# Patient Record
Sex: FEMALE | Race: WHITE | NOT HISPANIC OR LATINO
[De-identification: names, ages, dates, MRNs, and addresses within clinical notes are randomized per-mention and may not be internally consistent; named-entity substitution may affect disease eponyms.]

---

## 2017-03-25 ENCOUNTER — LAB SERVICES (OUTPATIENT)
Dept: OTHER | Age: 37
End: 2017-03-25

## 2017-03-25 ENCOUNTER — CHARTING TRANS (OUTPATIENT)
Dept: OTHER | Age: 37
End: 2017-03-25

## 2017-03-25 LAB — RAPID STREP GROUP A: NORMAL

## 2018-03-02 ENCOUNTER — HOSPITAL ENCOUNTER (OUTPATIENT)
Age: 38
Discharge: HOME | End: 2018-03-02
Attending: FAMILY MEDICINE
Payer: COMMERCIAL

## 2018-03-02 VITALS
SYSTOLIC BLOOD PRESSURE: 105 MMHG | HEART RATE: 90 BPM | TEMPERATURE: 98 F | DIASTOLIC BLOOD PRESSURE: 69 MMHG | OXYGEN SATURATION: 100 % | RESPIRATION RATE: 18 BRPM

## 2018-03-02 DIAGNOSIS — J04.0 ACUTE LARYNGITIS: Primary | ICD-10-CM

## 2018-03-02 DIAGNOSIS — J06.9 VIRAL UPPER RESPIRATORY TRACT INFECTION: ICD-10-CM

## 2018-03-02 LAB
S PYO AG THROAT QL: NEGATIVE
S PYO AG THROAT QL: NEGATIVE

## 2018-03-02 PROCEDURE — 87430 STREP A AG IA: CPT

## 2018-03-02 PROCEDURE — 99203 OFFICE O/P NEW LOW 30 MIN: CPT

## 2018-03-02 PROCEDURE — 99202 OFFICE O/P NEW SF 15 MIN: CPT

## 2018-03-02 RX ORDER — INSULIN LISPRO 100 [IU]/ML
INJECTION, SOLUTION INTRAVENOUS; SUBCUTANEOUS
COMMUNITY

## 2018-03-02 RX ORDER — LEVOTHYROXINE SODIUM 0.05 MG/1
TABLET ORAL
Refills: 5 | COMMUNITY
Start: 2018-02-01

## 2018-03-03 NOTE — ED INITIAL ASSESSMENT (HPI)
Pt here with complaint of a sore throat and left ear that began 4 days ago, pt states she has been coughing a lot as well, pt denies any fever

## 2018-03-03 NOTE — ED PROVIDER NOTES
Patient Seen in: [address]    History   CC:  Patient presents with:  Sore Throat  Ear Problem Pain (neurosensory)    Stated Complaint: ear ache; sore throat    ------------------------------  Per Rn:     here with compl adenopathy;     thyroid:  no enlargement/tenderness/nodules; no carotid    bruit or JVD   Heart   S1 S2 w/RRR   Lungs:     Clear to auscultation bilaterally, respirations unlabored   Chest Wall:    No tenderness or deformity   Abd:   Soft, Nt, No OSM

## 2018-11-05 VITALS
SYSTOLIC BLOOD PRESSURE: 94 MMHG | DIASTOLIC BLOOD PRESSURE: 66 MMHG | RESPIRATION RATE: 18 BRPM | HEART RATE: 84 BPM | TEMPERATURE: 98.3 F

## 2019-02-26 ENCOUNTER — HOSPITAL ENCOUNTER (OUTPATIENT)
Age: 39
Discharge: HOME | End: 2019-02-26
Attending: EMERGENCY MEDICINE
Payer: COMMERCIAL

## 2019-02-26 ENCOUNTER — APPOINTMENT (OUTPATIENT)
Dept: GENERAL RADIOLOGY | Age: 39
End: 2019-02-26
Attending: EMERGENCY MEDICINE
Payer: COMMERCIAL

## 2019-02-26 VITALS
WEIGHT: 129 LBS | HEIGHT: 63 IN | BODY MASS INDEX: 22.86 KG/M2 | DIASTOLIC BLOOD PRESSURE: 73 MMHG | SYSTOLIC BLOOD PRESSURE: 109 MMHG | TEMPERATURE: 98 F | OXYGEN SATURATION: 100 % | RESPIRATION RATE: 20 BRPM | HEART RATE: 72 BPM

## 2019-02-26 DIAGNOSIS — S30.0XXA CONTUSION OF COCCYX, INITIAL ENCOUNTER: Primary | ICD-10-CM

## 2019-02-26 DIAGNOSIS — S30.0XXA CONTUSION OF SACRUM, INITIAL ENCOUNTER: ICD-10-CM

## 2019-02-26 LAB — B-HCG UR QL: NEGATIVE

## 2019-02-26 PROCEDURE — 72220 X-RAY EXAM SACRUM TAILBONE: CPT | Performed by: EMERGENCY MEDICINE

## 2019-02-26 PROCEDURE — 81025 URINE PREGNANCY TEST: CPT

## 2019-02-26 PROCEDURE — 99213 OFFICE O/P EST LOW 20 MIN: CPT

## 2019-02-26 NOTE — ED PROVIDER NOTES
Patient Seen in: [address]    History   Patient presents with:  Back Pain (musculoskeletal)  Upper Extremity Injury (musculoskeletal)    Stated Complaint: Glenys Leash, injured back and arm    HPI    The patient is a 38-year-ol coccyx  Skin is intact  Neuro:  5/5 lower extremities  Normal gait  Sensation intact light touch in the periphery including the webspace of the first and second toes  Dorsiflexion of the great toes intact bilaterally      ED Course     Labs Reviewed   [facility]

## 2019-02-26 NOTE — ED INITIAL ASSESSMENT (HPI)
Pt states was carrying 11 yr old daughter down stairs in socks, when she slipped backwards. Pt states she feel back wards and hurt her left elbow and tail bone. Pt denies any tingling or numbness in extremities.  Pt denies hitting head or losing consciousnes

## 2019-08-19 ENCOUNTER — HOSPITAL ENCOUNTER (OUTPATIENT)
Age: 39
Discharge: HOME | End: 2019-08-19
Attending: EMERGENCY MEDICINE
Payer: COMMERCIAL

## 2019-08-19 VITALS
DIASTOLIC BLOOD PRESSURE: 75 MMHG | SYSTOLIC BLOOD PRESSURE: 144 MMHG | OXYGEN SATURATION: 100 % | HEART RATE: 74 BPM | TEMPERATURE: 98 F | RESPIRATION RATE: 18 BRPM

## 2019-08-19 DIAGNOSIS — N30.00 ACUTE CYSTITIS WITHOUT HEMATURIA: Primary | ICD-10-CM

## 2019-08-19 LAB
B-HCG UR QL: NEGATIVE
BILIRUB UR QL STRIP: NEGATIVE
CLARITY UR: CLEAR
COLOR UR: YELLOW
GLUCOSE UR STRIP-MCNC: NEGATIVE MG/DL
HGB UR QL STRIP: NEGATIVE
KETONES UR STRIP-MCNC: NEGATIVE MG/DL
NITRITE UR QL STRIP: NEGATIVE
PH UR STRIP: 6 [PH]
PROT UR STRIP-MCNC: NEGATIVE MG/DL
SP GR UR STRIP: 1.02
UROBILINOGEN UR STRIP-ACNC: <2 MG/DL

## 2019-08-19 PROCEDURE — 87086 URINE CULTURE/COLONY COUNT: CPT | Performed by: EMERGENCY MEDICINE

## 2019-08-19 PROCEDURE — 81002 URINALYSIS NONAUTO W/O SCOPE: CPT

## 2019-08-19 PROCEDURE — 81025 URINE PREGNANCY TEST: CPT

## 2019-08-19 PROCEDURE — 99214 OFFICE O/P EST MOD 30 MIN: CPT

## 2019-08-19 RX ORDER — CEPHALEXIN 500 MG/1
500 CAPSULE ORAL 3 TIMES DAILY
Qty: 9 CAPSULE | Refills: 0 | Status: SHIPPED | OUTPATIENT
Start: 2019-08-19 | End: 2019-08-22

## 2019-08-20 NOTE — ED PROVIDER NOTES
Patient Seen in: [address]    History   Patient presents with:  Urinary Symptoms (urologic)    Stated Complaint: POSSIBLE UTI    HPI    The patient is a 17-year-old female with a history of type 1 diabetes, hypothyroidi components:       Result Value    Leukocyte esterase urine Trace (*)     All other components within normal limits   [facility] POCT PREGNANCY URINE - Normal   URINE CULTURE, ROUTINE          Leukocytes are present in the urine and a patient with type 1 diabetes

## 2019-08-20 NOTE — ED INITIAL ASSESSMENT (HPI)
Pt here with complaints of a possible urine infection, pt states symptoms began yesterday ,she has been having pain with urination, urinary frequency and urgency, pt denies any fever at this time

## 2020-01-04 ENCOUNTER — HOSPITAL ENCOUNTER (OUTPATIENT)
Age: 40
Discharge: HOME | End: 2020-01-04
Attending: FAMILY MEDICINE
Payer: COMMERCIAL

## 2020-01-04 VITALS
DIASTOLIC BLOOD PRESSURE: 71 MMHG | RESPIRATION RATE: 18 BRPM | TEMPERATURE: 98 F | OXYGEN SATURATION: 100 % | HEART RATE: 68 BPM | SYSTOLIC BLOOD PRESSURE: 107 MMHG

## 2020-01-04 DIAGNOSIS — N30.01 ACUTE CYSTITIS WITH HEMATURIA: Primary | ICD-10-CM

## 2020-01-04 LAB
B-HCG UR QL: NEGATIVE
CLARITY UR: CLEAR
GLUCOSE UR STRIP-MCNC: 100 MG/DL
NITRITE UR QL STRIP: POSITIVE
PH UR STRIP: 5 [PH]
PROT UR STRIP-MCNC: 100 MG/DL
SP GR UR STRIP: 1.01
UROBILINOGEN UR STRIP-ACNC: 4 MG/DL

## 2020-01-04 PROCEDURE — 87186 SC STD MICRODIL/AGAR DIL: CPT | Performed by: FAMILY MEDICINE

## 2020-01-04 PROCEDURE — 99214 OFFICE O/P EST MOD 30 MIN: CPT

## 2020-01-04 PROCEDURE — 81025 URINE PREGNANCY TEST: CPT

## 2020-01-04 PROCEDURE — 87086 URINE CULTURE/COLONY COUNT: CPT | Performed by: FAMILY MEDICINE

## 2020-01-04 PROCEDURE — 87088 URINE BACTERIA CULTURE: CPT | Performed by: FAMILY MEDICINE

## 2020-01-04 PROCEDURE — 81002 URINALYSIS NONAUTO W/O SCOPE: CPT

## 2020-01-04 RX ORDER — NITROFURANTOIN 25; 75 MG/1; MG/1
100 CAPSULE ORAL 2 TIMES DAILY
Qty: 14 CAPSULE | Refills: 0 | Status: SHIPPED | OUTPATIENT
Start: 2020-01-04 | End: 2020-01-11

## 2020-01-04 NOTE — ED PROVIDER NOTES
Patient Seen in: [address]    History   Patient presents with:  Urinary Symptoms    Stated Complaint: POSSIBLE UTI    HPI    37year old Female patient presents with dysuria, urgency and frequency for 1 day.   Does have so air entry  CARDIO: RRR without murmur  GI:  Soft, mild suprapubic tenderness. No masses including no pulsatile masses. Normoactive b/s.    No flank tenderness b/l   SKIN: good skin turgor, no obvious rashes                 Labs Reviewed   [address] [place] [address] POCT URINALYSI

## 2020-01-04 NOTE — ED INITIAL ASSESSMENT (HPI)
Pt states yesterday began having urinary symptoms, pt states having pain with urination, increase in urination and frequency. Pt denies fevers. Pt states taking azo.

## 2020-09-30 ENCOUNTER — HOSPITAL ENCOUNTER (OUTPATIENT)
Age: 40
Discharge: ANOTHER HOSPITAL | End: 2020-09-30
Payer: COMMERCIAL

## 2020-09-30 VITALS
DIASTOLIC BLOOD PRESSURE: 88 MMHG | RESPIRATION RATE: 18 BRPM | TEMPERATURE: 97 F | SYSTOLIC BLOOD PRESSURE: 128 MMHG | OXYGEN SATURATION: 100 % | HEART RATE: 75 BPM

## 2020-09-30 DIAGNOSIS — R10.31 RLQ ABDOMINAL PAIN: Primary | ICD-10-CM

## 2020-09-30 PROCEDURE — 81025 URINE PREGNANCY TEST: CPT | Performed by: NURSE PRACTITIONER

## 2020-09-30 PROCEDURE — 81002 URINALYSIS NONAUTO W/O SCOPE: CPT | Performed by: NURSE PRACTITIONER

## 2020-09-30 PROCEDURE — 99213 OFFICE O/P EST LOW 20 MIN: CPT | Performed by: NURSE PRACTITIONER

## 2020-09-30 NOTE — ED NOTES
Pt discharged from here and is driving self to [facility] ED for eval of RLQ pain.  Pt is stable

## 2020-09-30 NOTE — ED INITIAL ASSESSMENT (HPI)
Pt here with c/o urinary urgency, frequency and burning. Pt started macobid on Sunday for symptoms. Denies fever or flank pain. Pt has lower abdominal pain. Pt has a long HX of UTIs  And has macrobid at home.

## 2020-09-30 NOTE — ED PROVIDER NOTES
Patient Seen in: [address]      History   Patient presents with:  Urinary Symptoms    Stated Complaint: uti    HPI    This is a 57-year-old female with a history of type 1 diabetes, thyroid disease who presents with a c 09/20/2020 (Exact Date)   SpO2 100%         Physical Exam  Vitals signs and nursing note reviewed. Constitutional:       General: She is not in acute distress. Appearance: Normal appearance. She is normal weight. HENT:      Head: Normocephalic. [id number]  [phone]                Medications Prescribed:  Current Discharge Medication List

## 2021-12-23 ENCOUNTER — HOSPITAL ENCOUNTER (OUTPATIENT)
Age: 41
Discharge: HOME | End: 2021-12-23
Payer: COMMERCIAL

## 2021-12-23 VITALS
DIASTOLIC BLOOD PRESSURE: 55 MMHG | HEART RATE: 81 BPM | OXYGEN SATURATION: 100 % | SYSTOLIC BLOOD PRESSURE: 108 MMHG | RESPIRATION RATE: 18 BRPM | TEMPERATURE: 98 F

## 2021-12-23 DIAGNOSIS — Z20.822 ENCOUNTER FOR LABORATORY TESTING FOR COVID-19 VIRUS: Primary | ICD-10-CM

## 2021-12-23 PROCEDURE — 99212 OFFICE O/P EST SF 10 MIN: CPT | Performed by: NURSE PRACTITIONER

## 2021-12-23 NOTE — ED PROVIDER NOTES
Patient presents with:  Testing      HPI:     Adeline Welch is a 35year old female who presents for a Covid test for travel. She denies any symptoms or complaints. She is fully vaccinated. She appears well and nontoxic.   [facility] asessment screens r rhonchi, or wheezes    MDM/Assessment/Plan:   Orders for this encounter:  Orders Placed This Encounter      SARS-CoV-2 by PCR (Ana)          Order Specific Question: Release to patient          Answer: Immediate      Labs performed this visit:  No resu

## 2021-12-23 NOTE — ED INITIAL ASSESSMENT (HPI)
Pt here for covid testing,pt states she is traveling to [facility] in 3 days, pt denies any symptoms at this time

## 2023-12-26 ENCOUNTER — HOSPITAL ENCOUNTER (OUTPATIENT)
Age: 43
Discharge: HOME | End: 2023-12-26
Payer: COMMERCIAL

## 2023-12-26 VITALS
HEART RATE: 81 BPM | SYSTOLIC BLOOD PRESSURE: 119 MMHG | DIASTOLIC BLOOD PRESSURE: 88 MMHG | TEMPERATURE: 98 F | OXYGEN SATURATION: 100 % | RESPIRATION RATE: 18 BRPM

## 2023-12-26 DIAGNOSIS — U07.1 COVID-19: Primary | ICD-10-CM

## 2023-12-26 DIAGNOSIS — Z11.52 ENCOUNTER FOR SCREENING FOR COVID-19: ICD-10-CM

## 2023-12-26 LAB
S PYO AG THROAT QL: NEGATIVE
SARS-COV-2 RNA RESP QL NAA+PROBE: DETECTED

## 2023-12-26 PROCEDURE — U0002 COVID-19 LAB TEST NON-CDC: HCPCS

## 2023-12-26 PROCEDURE — 99213 OFFICE O/P EST LOW 20 MIN: CPT

## 2023-12-26 PROCEDURE — 87880 STREP A ASSAY W/OPTIC: CPT

## 2023-12-26 NOTE — DISCHARGE INSTRUCTIONS
COVID test was positive. You should quarantine for 5 days. Regardless of your symptoms, you should wear a mask in public for 5 days. Go to the emergency department if you develop any chest pain, shortness of breath, fever, vomiting, diarrhea or any other concerning complaints. Use Flonase for nasal congestion, mucinex for chest congestion, tylenol or ibuprofen for fever or pain. Increase PO fluid intake. Follow up with PCP in 2-3 days.

## 2023-12-26 NOTE — ED INITIAL ASSESSMENT (HPI)
Pt states 2 days ago began having some cold symptoms. 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 Pt states having a sore throat, sinus pressure on the right side of face. Pt denies fevers, chest pain or SOB. Pt states painful to swallow.

## 2024-06-05 ENCOUNTER — HOSPITAL ENCOUNTER (OUTPATIENT)
Age: 44
Discharge: HOME | End: 2024-06-05
Payer: COMMERCIAL

## 2024-06-05 VITALS
HEART RATE: 80 BPM | SYSTOLIC BLOOD PRESSURE: 122 MMHG | DIASTOLIC BLOOD PRESSURE: 80 MMHG | OXYGEN SATURATION: 100 % | RESPIRATION RATE: 20 BRPM | TEMPERATURE: 98 F

## 2024-06-05 DIAGNOSIS — J06.9 VIRAL UPPER RESPIRATORY TRACT INFECTION WITH COUGH: Primary | ICD-10-CM

## 2024-06-05 DIAGNOSIS — Z20.818 STREP THROAT EXPOSURE: ICD-10-CM

## 2024-06-05 LAB — S PYO AG THROAT QL: NEGATIVE

## 2024-06-05 PROCEDURE — 87081 CULTURE SCREEN ONLY: CPT | Performed by: PHYSICIAN ASSISTANT

## 2024-06-05 PROCEDURE — 87880 STREP A ASSAY W/OPTIC: CPT | Performed by: PHYSICIAN ASSISTANT

## 2024-06-05 PROCEDURE — 99203 OFFICE O/P NEW LOW 30 MIN: CPT | Performed by: PHYSICIAN ASSISTANT

## 2024-06-05 NOTE — ED INITIAL ASSESSMENT (HPI)
Pt here with sore throat and cough that started 2 days ago. Exposed to strep at home. Declined Covid test.

## 2024-06-05 NOTE — ED PROVIDER NOTES
Patient Seen in: [facility]      History     Chief Complaint   Patient presents with    Sore Throat    Cough     Stated Complaint: sore throat    Subjective:   HPI    Patient is a 43-year-old female with history below, presenting to immediate care for evaluation of sore throat for 2 days.  Associated nasal congestion, postnasal drip, pain with swallowing, nonproductive cough.  Symptoms are mild to moderate.  Multiple children at home currently sick and also with strep throat.  Concern for possible strep throat infection.  No fevers.  No trismus or drooling.  No facial swelling.  No neck stiffness.  No chest pain or shortness of breath.  No weakness or confusion.      Objective:   Past Medical History:    Thyroid disease    hypothyroidism    Type 1 diabetes mellitus (HCC)              History reviewed. No pertinent surgical history.             Social History     Socioeconomic History    Marital status:    Tobacco Use    Smoking status: Never    Smokeless tobacco: Never   Vaping Use    Vaping status: Never Used   Substance and Sexual Activity    Alcohol use: Yes    Drug use: Never     Social Determinants of Health     Food Insecurity: Low Risk  (7/5/2023)    [facility]    Food Insecurity     Have there been times that your food ran out, and you didn't have money to get more?: No     Are there times that you worry that this might happen?: No   Transportation Needs: Low Risk  (7/5/2023)    [facility]    Transportation Needs     Do you have trouble getting transportation to medical appointments?: No    [facility], [facility]    Social Connections              Review of Systems   Constitutional:  Negative for chills and fever.   HENT:  Positive for congestion and sore throat. Negative for facial swelling,  trouble swallowing and voice change.    Respiratory:  Positive for cough. Negative for shortness of breath.    Cardiovascular:  Negative for chest pain.   Gastrointestinal:  Negative for abdominal pain, nausea and vomiting.   Musculoskeletal:  Negative for neck pain and neck stiffness.   Skin:  Negative for rash.   Allergic/Immunologic: Positive for environmental allergies.   Neurological:  Negative for weakness and headaches.   Psychiatric/Behavioral:  Negative for confusion.    All other systems reviewed and are negative.      Positive for stated complaint: sore throat  Other systems are as noted in HPI.  Constitutional and vital signs reviewed.      All other systems reviewed and negative except as noted above.    Physical Exam     ED Triage Vitals [06/05/24 0915]   /80   Pulse 80   Resp 20   Temp 98.3 °F (36.8 °C)   Temp src Oral   SpO2 100 %   O2 Device None (Room air)       Current Vitals:   Vital Signs  BP: 122/80  Pulse: 80  Resp: 20  Temp: 98.3 °F (36.8 °C)  Temp src: Oral    Oxygen Therapy  SpO2: 100 %  O2 Device: None (Room air)            Physical Exam  Vitals and nursing note reviewed.   Constitutional:       General: She is not in acute distress.     Appearance: Normal appearance. She is not ill-appearing, toxic-appearing or diaphoretic.   HENT:      Head: Normocephalic and atraumatic.      Right Ear: Tympanic membrane normal.      Left Ear: Tympanic membrane normal.      Nose: Congestion present.      Mouth/Throat:      Mouth: Mucous membranes are moist.      Pharynx: Uvula midline. No oropharyngeal exudate or posterior oropharyngeal erythema.      Tonsils: No tonsillar exudate or tonsillar abscesses. 1+ on the right. 1+ on the left.      Comments: Postnasal drip.  Eyes:      Conjunctiva/sclera: Conjunctivae normal.   Cardiovascular:      Rate and Rhythm: Normal rate and regular rhythm.      Pulses: Normal pulses.   Pulmonary:      Effort: Pulmonary effort is normal. No respiratory distress.       Breath sounds: Normal breath sounds. No stridor. No wheezing or rhonchi.   Musculoskeletal:         General: No swelling or tenderness. Normal range of motion.      Cervical back: Normal range of motion and neck supple. No rigidity or tenderness.   Lymphadenopathy:      Cervical: No cervical adenopathy.   Skin:     Capillary Refill: Capillary refill takes less than 2 seconds.   Neurological:      General: No focal deficit present.      Mental Status: She is oriented to person, place, and time.      Motor: No weakness.      Gait: Gait normal.   Psychiatric:         Mood and Affect: Mood normal.         Behavior: Behavior normal.         Thought Content: Thought content normal.         Judgment: Judgment normal.             Labs Reviewed   POCT RAPID STREP - Normal   GRP A STREP CULT, THROAT     Results for orders placed or performed during the hospital encounter of 06/05/24   POCT Rapid Strep    Collection Time: 06/05/24  9:30 AM   Result Value Ref Range    POCT Rapid Strep Negative Negative              MDM     Dx: Acute Viral Pharyngitis, Initial Encounter  Strep rapid POC negative  Strep Throat Culture sent/pending [multiple children at home with current strep throat]  COVID PCR  testing negative  Overall well-appearing  Hemodynamically stable  Afebrile  Tolerating PO  Outpatient management  Supportive care  Rest  Oral hydration  Motrin/Tylenol as needed for pain/fever  Lozenges for sore throat  Honey-Tea  PCP follow  Return precaution        Medical Decision Making      Disposition and Plan     Clinical Impression:  1. Viral upper respiratory tract infection with cough    2. Strep throat exposure         Disposition:  Discharge  6/5/2024 10:09 am    Follow-up:  No follow-up provider specified.        Medications Prescribed:  Discharge Medication List as of 6/5/2024 10:10 AM

## 2024-12-08 ENCOUNTER — HOSPITAL ENCOUNTER (OUTPATIENT)
Age: 44
Discharge: HOME | End: 2024-12-08
Payer: COMMERCIAL

## 2024-12-08 VITALS
TEMPERATURE: 98 F | SYSTOLIC BLOOD PRESSURE: 132 MMHG | RESPIRATION RATE: 20 BRPM | DIASTOLIC BLOOD PRESSURE: 53 MMHG | OXYGEN SATURATION: 99 % | HEART RATE: 73 BPM

## 2024-12-08 DIAGNOSIS — J02.9 VIRAL PHARYNGITIS: Primary | ICD-10-CM

## 2024-12-08 LAB — S PYO AG THROAT QL: NEGATIVE

## 2024-12-08 RX ORDER — ATORVASTATIN CALCIUM 10 MG/1
10 TABLET, FILM COATED ORAL DAILY
COMMUNITY
Start: 2024-04-17

## 2024-12-08 NOTE — ED INITIAL ASSESSMENT (HPI)
Patient arrives ambulatory with c/o sore throat, left side of head congested. 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 Home covid test negative. Hx type 1 diabetes, sugars slightly higher than normal (150-200s).

## 2024-12-08 NOTE — DISCHARGE INSTRUCTIONS
As discussed, you are negative for strep throat.  Symptoms are likely viral in nature.  Recommend supportive and symptomatic management at home.  This includes drinking plenty water electrolytes.  Salt water gargles, warm tea with honey and lemon, Cepacol lozenges over-the-counter.  I do recommend that you get plenty of rest.  Sleep somewhat elevated upright.  Sleep with humidifier.  Steam showers for cough and congestion.    You may take Tylenol Motrin as needed for throat discomfort, body aches expectorate.    Please be aware that most respiratory viruses we are seeing are lasting 2 weeks or longer.  Days 3-5 are typically the worst of symptoms.  If you have any worsening cough that becomes more productive, with fever/chills/body aches, please follow-up with your primary care doctor return to immediate care for evaluation.    You have any worsening sinus pressure, congestion, dental pain, facial pain, ear pain, ocular pain and symptoms have been persisting for greater than 7 to 10 days, please follow-up with your primary care doctor for reevaluation

## 2024-12-11 ENCOUNTER — APPOINTMENT (OUTPATIENT)
Dept: GENERAL RADIOLOGY | Age: 44
End: 2024-12-11
Attending: PHYSICIAN ASSISTANT
Payer: COMMERCIAL

## 2024-12-11 ENCOUNTER — HOSPITAL ENCOUNTER (OUTPATIENT)
Age: 44
Discharge: HOME | End: 2024-12-11
Payer: COMMERCIAL

## 2024-12-11 VITALS
DIASTOLIC BLOOD PRESSURE: 70 MMHG | SYSTOLIC BLOOD PRESSURE: 124 MMHG | HEART RATE: 74 BPM | TEMPERATURE: 98 F | RESPIRATION RATE: 20 BRPM | OXYGEN SATURATION: 100 %

## 2024-12-11 DIAGNOSIS — Z20.822 LAB TEST NEGATIVE FOR COVID-19 VIRUS: ICD-10-CM

## 2024-12-11 DIAGNOSIS — R05.1 ACUTE COUGH: ICD-10-CM

## 2024-12-11 DIAGNOSIS — J01.00 ACUTE NON-RECURRENT MAXILLARY SINUSITIS: ICD-10-CM

## 2024-12-11 DIAGNOSIS — J40 BRONCHITIS: Primary | ICD-10-CM

## 2024-12-11 DIAGNOSIS — E10.65 TYPE 1 DIABETES MELLITUS WITH HYPERGLYCEMIA (HCC): ICD-10-CM

## 2024-12-11 LAB
#MXD IC: 0.7 X10ˆ3/UL (ref 0.1–1)
ATRIAL RATE: 72 BPM
BUN BLD-MCNC: 9 MG/DL (ref 7–18)
CHLORIDE BLD-SCNC: 104 MMOL/L (ref 98–112)
CO2 BLD-SCNC: 25 MMOL/L (ref 21–32)
CREAT BLD-MCNC: 0.7 MG/DL
EGFRCR SERPLBLD CKD-EPI 2021: 108 ML/MIN/1.73M2 (ref 60–?)
GLUCOSE BLD-MCNC: 120 MG/DL (ref 70–99)
HCT VFR BLD AUTO: 36.7 %
HCT VFR BLD CALC: 39 %
HGB BLD-MCNC: 12 G/DL
ISTAT IONIZED CALCIUM FOR CHEM 8: 1.23 MMOL/L (ref 1.12–1.32)
LYMPHOCYTES # BLD AUTO: 1.6 X10ˆ3/UL (ref 1–4)
LYMPHOCYTES NFR BLD AUTO: 16.5 %
MCH RBC QN AUTO: 30.2 PG (ref 26–34)
MCHC RBC AUTO-ENTMCNC: 32.7 G/DL (ref 31–37)
MCV RBC AUTO: 92.4 FL (ref 80–100)
MIXED CELL %: 6.8 %
NEUTROPHILS # BLD AUTO: 7.5 X10ˆ3/UL (ref 1.5–7.7)
NEUTROPHILS NFR BLD AUTO: 76.7 %
P AXIS: 28 DEGREES
P-R INTERVAL: 114 MS
PLATELET # BLD AUTO: 237 X10ˆ3/UL (ref 150–450)
POTASSIUM BLD-SCNC: 4.1 MMOL/L (ref 3.6–5.1)
Q-T INTERVAL: 386 MS
QRS DURATION: 84 MS
QTC CALCULATION (BEZET): 422 MS
R AXIS: 72 DEGREES
RBC # BLD AUTO: 3.97 X10ˆ6/UL
SARS-COV-2 RNA RESP QL NAA+PROBE: NOT DETECTED
SODIUM BLD-SCNC: 141 MMOL/L (ref 136–145)
T AXIS: 31 DEGREES
TROPONIN I BLD-MCNC: <0.02 NG/ML
VENTRICULAR RATE: 72 BPM
WBC # BLD AUTO: 9.8 X10ˆ3/UL (ref 4–11)

## 2024-12-11 PROCEDURE — 93000 ELECTROCARDIOGRAM COMPLETE: CPT | Performed by: PHYSICIAN ASSISTANT

## 2024-12-11 PROCEDURE — 85025 COMPLETE CBC W/AUTO DIFF WBC: CPT | Performed by: PHYSICIAN ASSISTANT

## 2024-12-11 PROCEDURE — 80047 BASIC METABLC PNL IONIZED CA: CPT | Performed by: PHYSICIAN ASSISTANT

## 2024-12-11 PROCEDURE — 71046 X-RAY EXAM CHEST 2 VIEWS: CPT | Performed by: PHYSICIAN ASSISTANT

## 2024-12-11 PROCEDURE — 94640 AIRWAY INHALATION TREATMENT: CPT | Performed by: PHYSICIAN ASSISTANT

## 2024-12-11 PROCEDURE — 99214 OFFICE O/P EST MOD 30 MIN: CPT | Performed by: PHYSICIAN ASSISTANT

## 2024-12-11 PROCEDURE — U0002 COVID-19 LAB TEST NON-CDC: HCPCS | Performed by: PHYSICIAN ASSISTANT

## 2024-12-11 PROCEDURE — 84484 ASSAY OF TROPONIN QUANT: CPT | Performed by: PHYSICIAN ASSISTANT

## 2024-12-11 RX ORDER — DOXYCYCLINE 100 MG/1
100 CAPSULE ORAL 2 TIMES DAILY
Qty: 14 CAPSULE | Refills: 0 | Status: SHIPPED | OUTPATIENT
Start: 2024-12-11 | End: 2024-12-18

## 2024-12-11 RX ORDER — BENZONATATE 100 MG/1
100 CAPSULE ORAL 3 TIMES DAILY PRN
Qty: 30 CAPSULE | Refills: 0 | Status: SHIPPED | OUTPATIENT
Start: 2024-12-11 | End: 2025-01-10

## 2024-12-11 RX ORDER — ALBUTEROL SULFATE 90 UG/1
2 INHALANT RESPIRATORY (INHALATION) EVERY 4 HOURS PRN
Qty: 1 EACH | Refills: 0 | Status: SHIPPED | OUTPATIENT
Start: 2024-12-11 | End: 2025-01-10

## 2024-12-11 RX ORDER — IPRATROPIUM BROMIDE AND ALBUTEROL SULFATE 2.5; .5 MG/3ML; MG/3ML
3 SOLUTION RESPIRATORY (INHALATION) ONCE
Status: COMPLETED | OUTPATIENT
Start: 2024-12-11 | End: 2024-12-11

## 2024-12-11 RX ORDER — CODEINE PHOSPHATE AND GUAIFENESIN 10; 100 MG/5ML; MG/5ML
5 SOLUTION ORAL EVERY 6 HOURS PRN
Qty: 120 ML | Refills: 0 | Status: SHIPPED | OUTPATIENT
Start: 2024-12-11

## 2024-12-11 RX ORDER — DOXYCYCLINE 100 MG/1
100 CAPSULE ORAL 2 TIMES DAILY
Qty: 14 CAPSULE | Refills: 0 | Status: SHIPPED | OUTPATIENT
Start: 2024-12-11 | End: 2024-12-11

## 2024-12-11 NOTE — ED PROVIDER NOTES
Patient Seen in: [facility]      History     Chief Complaint   Patient presents with    Cough/URI     Stated Complaint: Cold symptoms worsening    Subjective:   HPI    Patient is a 44-year-old  female with type 1 diabetes, hypothyroidism, presenting to immediate care for evaluation of worsening cold symptoms.  Patient was seen on 12/8/2024 for nasal/sinus congestion with sore throat.  Had negative strep testing.  Diagnosed with acute viral pharyngitis.  States has been experiencing worsening nonproductive cough with chest congestion/tightness.  Overall feels unwell with generalized malaise/fatigue.  Patient had scheduled jury duty and was sent home due to recurrent illness.  She is coming to immediate care for further evaluation given worsening symptoms.  No fevers.  No shortness of breath.  No weakness or confusion.  Not immunocompromise.  Non-smoker.  No cardiopulmonary history      Objective:     Past Medical History:    Thyroid disease    hypothyroidism    Type 1 diabetes mellitus (HCC)              History reviewed. No pertinent surgical history.             Social History     Socioeconomic History    Marital status:    Tobacco Use    Smoking status: Never    Smokeless tobacco: Never   Vaping Use    Vaping status: Never Used   Substance and Sexual Activity    Alcohol use: Yes    Drug use: Never     Social Drivers of Health     Food Insecurity: Low Risk  (7/5/2023)    [facility]    Food Insecurity     Have there been times that your food ran out, and you didn't have money to get more?: No     Are there times that you worry that this might happen?: No   Transportation Needs: Low Risk  (7/5/2023)    [facility]    Transportation Needs     Do you have trouble getting transportation to medical appointments?: No    Received from [facility]  [facility], [facility]    Social Connections              Review of Systems   Constitutional:  Positive for activity change, appetite change and fatigue. Negative for chills and fever.   HENT:  Positive for congestion.    Respiratory:  Positive for cough and chest tightness.    Gastrointestinal:  Negative for abdominal pain, nausea and vomiting.   Musculoskeletal:  Positive for myalgias.   Skin:  Negative for rash.   Allergic/Immunologic: Negative for immunocompromised state.   Neurological:  Negative for dizziness, weakness, light-headedness and headaches.   Psychiatric/Behavioral:  Negative for confusion.        Positive for stated complaint: Cold symptoms worsening  Other systems are as noted in HPI.  Constitutional and vital signs reviewed.      All other systems reviewed and negative except as noted above.    Physical Exam     ED Triage Vitals [12/11/24 0945]   /70   Pulse 74   Resp 24   Temp 98.2 °F (36.8 °C)   Temp src Oral   SpO2 100 %   O2 Device None (Room air)       Current Vitals:   Vital Signs  BP: 124/70  Pulse: 74  Resp: 24  Temp: 98.2 °F (36.8 °C)  Temp src: Oral    Oxygen Therapy  SpO2: 100 %  O2 Device: None (Room air)        Physical Exam  Vitals and nursing note reviewed.   Constitutional:       General: She is not in acute distress.     Appearance: Normal appearance. She is ill-appearing. She is not toxic-appearing or diaphoretic.      Comments: Alert, cooperative, pleasant, appears ill.  No acute respiratory distress   HENT:      Head: Normocephalic and atraumatic.      Right Ear: Tympanic membrane normal.      Left Ear: Tympanic membrane normal.      Ears:      Comments: Bilateral ear effusion without infection.  TM without erythema or effusion     Nose: Congestion present.      Mouth/Throat:      Mouth: Mucous membranes are moist.      Pharynx: No oropharyngeal exudate or posterior oropharyngeal erythema.      Comments: Postnasal drip  Eyes:       Conjunctiva/sclera: Conjunctivae normal.   Cardiovascular:      Rate and Rhythm: Normal rate and regular rhythm.      Pulses: Normal pulses.   Pulmonary:      Effort: Pulmonary effort is normal. No respiratory distress.      Breath sounds: No wheezing or rhonchi.      Comments: Lungs diminished without rales or wheezing.  No conversational dyspnea  Abdominal:      Palpations: Abdomen is soft.      Tenderness: There is no abdominal tenderness.   Musculoskeletal:         General: No swelling or tenderness. Normal range of motion.      Cervical back: Normal range of motion and neck supple. No rigidity or tenderness.   Lymphadenopathy:      Cervical: No cervical adenopathy.   Skin:     Capillary Refill: Capillary refill takes less than 2 seconds.   Neurological:      General: No focal deficit present.      Mental Status: She is alert and oriented to person, place, and time.      Motor: No weakness.      Gait: Gait normal.   Psychiatric:         Mood and Affect: Mood normal.         Behavior: Behavior normal.         Thought Content: Thought content normal.         Judgment: Judgment normal.           ED Course     Labs Reviewed   POCT ISTAT CHEM8 CARTRIDGE - Abnormal; Notable for the following components:       Result Value    ISTAT Glucose 120 (*)     All other components within normal limits   ISTAT TROPONIN - Normal   RAPID SARS-COV-2 BY PCR - Normal   POCT CBC     EKG    Rate, intervals and axes as noted on EKG Report.  Rate: 72  Rhythm: Sinus Rhythm  Reading: Normal sinus rhythm, 72 bpm, no STEMI, no block QTc, QTc 422 ms, normal axis.  Normal EKG           Results for orders placed or performed during the hospital encounter of 12/11/24   POCT CBC    Collection Time: 12/11/24 10:09 AM   Result Value Ref Range    WBC IC 9.8 4.0 - 11.0 x10ˆ3/uL    RBC IC 3.97 3.80 - 5.30 X10ˆ6/uL    HGB IC 12.0 12.0 - 16.0 g/dL    HCT IC 36.7 35.0 - 48.0 %    MCV IC 92.4 80.0 - 100.0 fL    MCH IC 30.2 26.0 - 34.0 pg    MCHC IC 32.7  31.0 - 37.0 g/dL    PLT .0 150.0 - 450.0 X10ˆ3/uL    # Neutrophil 7.5 1.5 - 7.7 X10ˆ3/uL    # Lymphocyte 1.6 1.0 - 4.0 X10ˆ3/uL    # Mixed Cells 0.7 0.1 - 1.0 X10ˆ3/uL    Neutrophil % 76.7 %    Lymphocyte % 16.5 %    Mixed Cell % 6.8 %   Rapid SARS-CoV-2 by PCR    Collection Time: 12/11/24 10:09 AM    Specimen: Nares; Other   Result Value Ref Range    Rapid SARS-CoV-2 by PCR Not Detected Not Detected   POCT ISTAT chem8 cartridge    Collection Time: 12/11/24 10:23 AM   Result Value Ref Range    ISTAT Sodium 141 136 - 145 mmol/L    ISTAT BUN 9 7 - 18 mg/dL    ISTAT Potassium 4.1 3.6 - 5.1 mmol/L    ISTAT Chloride 104 98 - 112 mmol/L    ISTAT Ionized Calcium 1.23 1.12 - 1.32 mmol/L    ISTAT Hematocrit 39 34 - 50 %    ISTAT Glucose 120 (H) 70 - 99 mg/dL    ISTAT TCO2 25 21 - 32 mmol/L    ISTAT Creatinine 0.70 0.55 - 1.02 mg/dL    eGFR-Cr 108 >=60 mL/min/1.73m2   EKG 12 Lead    Collection Time: 12/11/24 10:26 AM   Result Value Ref Range    Ventricular rate 72 BPM    Atrial rate 72 BPM    P-R Interval 114 ms    QRS Duration 84 ms    Q-T Interval 386 ms    QTC Calculation (Bezet) 422 ms    P Axis 28 degrees    R Axis 72 degrees    T Axis 31 degrees   ISTAT Troponin    Collection Time: 12/11/24 10:34 AM   Result Value Ref Range    ISTAT Troponin <0.02 <0.045 ng/mL ng/mL     XR CHEST PA + LAT CHEST (CPT=71046)   Final Result   PROCEDURE: XR CHEST PA + LAT CHEST (CPT=71046)       COMPARISON: None.       INDICATIONS: Cough and congestion for 2 weeks.       TECHNIQUE:   Two views.         FINDINGS:    CARDIAC/VASC: No cardiac silhouette abnormality or cardiomegaly.     Unremarkable pulmonary vasculature.     MEDIAST/ANUPAM: No visible mass or adenopathy.    LUNGS/PLEURA: No significant pulmonary parenchymal abnormalities.  No    effusion or pleural thickening.     BONES: Mild scoliosis with mild degenerative disc disease and spondylosis.        OTHER: Left breast biopsy clip visualized.                   =====    CONCLUSION:    No acute cardiopulmonary disease.           Dictated by (CST): Dinesh Chadwick MD on 12/11/2024 at 10:23 AM        Finalized by (CST): Dinesh Chadwick MD on 12/11/2024 at 10:24 AM                      MDM     Patient is a 44-year-old female with history above, presenting to immediate care for evaluation of worsening cold symptoms.  Onset of symptoms 6 days.  Seen here on 12/8/2024 and diagnosed with acute viral pharyngitis.  Symptoms worsening.  Main concern now is associated nonproductive cough with associated chest tightness/congestion.  Concern for possible pneumonia.  Here for further evaluation.  Patient is alert, cooperative, pleasant, does appear ill.  She is hemodynamically stable.  Normal tensive.  Non-tachycardic not hypoxic.  Normal cardiac exam.  Lungs diminished without rales or wheezing.  Workup here unremarkable.  No leukocytosis.  No anemia.  Electrolytes within normal limits.  Glucose minimally elevated.  Patient type I diabetic well-controlled.  Normal renal function.  Nonelevated troponin.  EKG is normal sinus rhythm.  Chest x-ray without pneumonia.  Given DuoNeb for chest congestion/cough.  Symptomatic improvement.  Exam and history consistent with bronchitis with underlying maxillary sinusitis.  Will treat for bacterial etiology.  Prescription for 7-day course of oral antibiotic doxycycline.  Will prescribe her Tessalon and Tessalon for cough.  Albuterol inhaler for shortness of breath/wheezing/bronchospasm.  PCP follow-up.  ED return precautions      Medical Decision Making      Disposition and Plan     Clinical Impression:  1. Bronchitis    2. Acute cough    3. Lab test negative for COVID-19 virus    4. Type 1 diabetes mellitus with hyperglycemia (HCC)    5. Acute non-recurrent maxillary sinusitis         Disposition:  Discharge  12/11/2024 10:52 am    Follow-up:  No follow-up provider specified.        Medications Prescribed:  Current Discharge Medication List         START taking these medications    Details   guaiFENesin-codeine (VIRTUSSIN A/C) 100-10 MG/5ML Oral Solution Take 5 mL by mouth every 6 (six) hours as needed for cough.  Qty: 120 mL, Refills: 0    Associated Diagnoses: Bronchitis      benzonatate 100 MG Oral Cap Take 1 capsule (100 mg total) by mouth 3 (three) times daily as needed for cough.  Qty: 30 capsule, Refills: 0      albuterol 108 (90 Base) MCG/ACT Inhalation Aero Soln Inhale 2 puffs into the lungs every 4 (four) hours as needed for Wheezing.  Qty: 1 each, Refills: 0      doxycycline 100 MG Oral Cap Take 1 capsule (100 mg total) by mouth 2 (two) times daily for 7 days.  Qty: 14 capsule, Refills: 0                 Supplementary Documentation:

## 2024-12-11 NOTE — ED INITIAL ASSESSMENT (HPI)
Pt states was here on the 8th with same symptoms, pt states symptoms are worse now. Pt states is now painful to cough.